# Patient Record
Sex: MALE | Race: WHITE | NOT HISPANIC OR LATINO
[De-identification: names, ages, dates, MRNs, and addresses within clinical notes are randomized per-mention and may not be internally consistent; named-entity substitution may affect disease eponyms.]

---

## 2022-01-10 ENCOUNTER — APPOINTMENT (OUTPATIENT)
Dept: PHYSICAL MEDICINE AND REHAB | Facility: CLINIC | Age: 57
End: 2022-01-10

## 2022-01-10 VITALS
HEART RATE: 80 BPM | HEIGHT: 72 IN | SYSTOLIC BLOOD PRESSURE: 130 MMHG | WEIGHT: 220 LBS | BODY MASS INDEX: 29.8 KG/M2 | OXYGEN SATURATION: 100 % | DIASTOLIC BLOOD PRESSURE: 80 MMHG | TEMPERATURE: 98.6 F

## 2022-01-10 DIAGNOSIS — Z86.69 PERSONAL HISTORY OF OTHER DISEASES OF THE NERVOUS SYSTEM AND SENSE ORGANS: ICD-10-CM

## 2022-01-10 DIAGNOSIS — Z80.8 FAMILY HISTORY OF MALIGNANT NEOPLASM OF OTHER ORGANS OR SYSTEMS: ICD-10-CM

## 2022-01-10 DIAGNOSIS — Z86.73 PERSONAL HISTORY OF TRANSIENT ISCHEMIC ATTACK (TIA), AND CEREBRAL INFARCTION W/OUT RESIDUAL DEFICITS: ICD-10-CM

## 2022-01-10 PROBLEM — Z00.00 ENCOUNTER FOR PREVENTIVE HEALTH EXAMINATION: Status: ACTIVE | Noted: 2022-01-10

## 2022-01-10 RX ORDER — HYDROCHLOROTHIAZIDE 12.5 MG/1
TABLET ORAL
Refills: 0 | Status: ACTIVE | COMMUNITY

## 2022-01-25 NOTE — END OF VISIT
[Time Spent: ___ minutes] : I have spent [unfilled] minutes of time on the encounter. Alert and oriented to person, place, time/situation. normal mood and affect. no apparent risk to self or others.

## 2022-02-14 ENCOUNTER — APPOINTMENT (OUTPATIENT)
Dept: PHYSICAL MEDICINE AND REHAB | Facility: CLINIC | Age: 57
End: 2022-02-14

## 2022-02-14 VITALS
HEART RATE: 78 BPM | OXYGEN SATURATION: 99 % | HEIGHT: 72 IN | BODY MASS INDEX: 29.8 KG/M2 | WEIGHT: 220 LBS | TEMPERATURE: 97.8 F

## 2022-02-14 NOTE — PHYSICAL EXAM
[FreeTextEntry1] : Examination of the Cervical Spine \par Flexion: 34 degrees\par Extension: 12 degrees\par Lateral Bend R 24 degrees    L 20 degrees\par Rotation R 42 degrees     L 46 degrees \par \par Palpation cervical spine: Tenderness and spasm bilateral trapezii and cervical paraspinal musculature. \par MMT U/E: left elbow flexion 5-./5 \par Reflexes: 2 throughout \par   [Normal] : The posterior cervical, anterior cervical, supraclavicular, axillary, femoral and inguinal nodes were non-tender and normal size [de-identified] : see exam  [de-identified] : see exam  [de-identified] : see exam  [de-identified] : see exam

## 2022-02-14 NOTE — ASSESSMENT
[FreeTextEntry1] : Acupuncture 10 sessions C/S  \par Awaiting Auth for PT 3xweek/Week's \par HEP \par Recheck 4 weeks

## 2022-02-14 NOTE — HISTORY OF PRESENT ILLNESS
[FreeTextEntry1] : Pt continues with c/o neck pain with intermittent radiation of pain and paresthesia involving his bilateral upper Extemities. 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 [No] : The patient is currently not working.

## 2022-02-17 ENCOUNTER — APPOINTMENT (OUTPATIENT)
Dept: PHYSICAL MEDICINE AND REHAB | Facility: CLINIC | Age: 57
End: 2022-02-17

## 2022-02-25 ENCOUNTER — APPOINTMENT (OUTPATIENT)
Dept: PHYSICAL MEDICINE AND REHAB | Facility: CLINIC | Age: 57
End: 2022-02-25

## 2022-03-01 ENCOUNTER — APPOINTMENT (OUTPATIENT)
Dept: PHYSICAL MEDICINE AND REHAB | Facility: CLINIC | Age: 57
End: 2022-03-01

## 2022-03-08 ENCOUNTER — APPOINTMENT (OUTPATIENT)
Dept: PHYSICAL MEDICINE AND REHAB | Facility: CLINIC | Age: 57
End: 2022-03-08

## 2022-03-15 ENCOUNTER — APPOINTMENT (OUTPATIENT)
Dept: PHYSICAL MEDICINE AND REHAB | Facility: CLINIC | Age: 57
End: 2022-03-15

## 2022-03-22 ENCOUNTER — APPOINTMENT (OUTPATIENT)
Dept: PHYSICAL MEDICINE AND REHAB | Facility: CLINIC | Age: 57
End: 2022-03-22

## 2022-03-22 NOTE — HISTORY OF PRESENT ILLNESS
[FreeTextEntry1] : Pt continues with c/o neck pain with intermittent radiation of pain and paresthesia involving his bilateral upper extremities. Pt reports acupuncture has been beneficial in increasing ROM, an improving level of function. 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 [No] : The patient is currently not working.

## 2022-03-22 NOTE — PHYSICAL EXAM
[FreeTextEntry1] : Examination of the Cervical Spine \par Flexion: 37 degrees\par Extension: 15 degrees\par Lateral Bend R 27 degrees    L 22 degrees\par Rotation R 48 degrees     L 50 degrees \par \par Palpation cervical spine: Tenderness and spasm bilateral trapezii and cervical paraspinal musculature. \par MMT U/E: left elbow flexion 5-./5 \par Reflexes: 2 throughout \par   [Normal] : The posterior cervical, anterior cervical, supraclavicular, axillary, femoral and inguinal nodes were non-tender and normal size [de-identified] : see exam  [de-identified] : see exam  [de-identified] : see exam  [de-identified] : see exam

## 2022-04-11 ENCOUNTER — APPOINTMENT (OUTPATIENT)
Dept: PHYSICAL MEDICINE AND REHAB | Facility: CLINIC | Age: 57
End: 2022-04-11

## 2022-04-11 NOTE — PHYSICAL EXAM
[FreeTextEntry1] : Examination of the Cervical Spine \par Flexion: 40 degrees\par Extension: 16  degrees\par Lateral Bend R 27 degrees    L 22 degrees\par Rotation R 48 degrees     L 50 degrees \par \par Palpation cervical spine: Tenderness and spasm bilateral trapezii and cervical paraspinal musculature. \par MMT U/E: left elbow flexion 5-./5 \par Reflexes: 2 throughout \par   [Normal] : The posterior cervical, anterior cervical, supraclavicular, axillary, femoral and inguinal nodes were non-tender and normal size [de-identified] : see exam  [de-identified] : see exam  [de-identified] : see exam  [de-identified] : see exam

## 2022-04-11 NOTE — ASSESSMENT
[FreeTextEntry1] : Acupuncture 10 sessions C/S  \par HEP \par Recheck 4 weeks [Indicate if, in your opinion, the incident that the patient described was the competent medical cause of this injury/illness.] : The incident that the patient described was the competent medical cause of this injury/illness: Yes [Indicate if the patient's complaints are consistent with his/her history of the injury/illness.] : Indicate if the patient's complaints are consistent with his/her history of the injury/illness: Yes [Yes] : Yes, it is consistent [FreeTextEntry5] : 100%

## 2022-04-12 ENCOUNTER — APPOINTMENT (OUTPATIENT)
Dept: PHYSICAL MEDICINE AND REHAB | Facility: CLINIC | Age: 57
End: 2022-04-12
Payer: OTHER MISCELLANEOUS

## 2022-04-12 PROCEDURE — 99072 ADDL SUPL MATRL&STAF TM PHE: CPT

## 2022-04-12 PROCEDURE — 97814 ACUP 1/> W/ESTIM EA ADDL 15: CPT

## 2022-04-12 PROCEDURE — 97813 ACUP 1/> W/ESTIM 1ST 15 MIN: CPT

## 2022-04-12 PROCEDURE — 97811 ACUP 1/> W/O ESTIM EA ADD 15: CPT | Mod: 59

## 2022-04-26 ENCOUNTER — APPOINTMENT (OUTPATIENT)
Dept: PHYSICAL MEDICINE AND REHAB | Facility: CLINIC | Age: 57
End: 2022-04-26
Payer: OTHER MISCELLANEOUS

## 2022-04-26 PROCEDURE — 97811 ACUP 1/> W/O ESTIM EA ADD 15: CPT | Mod: 59

## 2022-04-26 PROCEDURE — 99072 ADDL SUPL MATRL&STAF TM PHE: CPT

## 2022-04-26 PROCEDURE — 97813 ACUP 1/> W/ESTIM 1ST 15 MIN: CPT

## 2022-04-26 PROCEDURE — 97814 ACUP 1/> W/ESTIM EA ADDL 15: CPT

## 2022-05-03 ENCOUNTER — APPOINTMENT (OUTPATIENT)
Dept: PHYSICAL MEDICINE AND REHAB | Facility: CLINIC | Age: 57
End: 2022-05-03
Payer: OTHER MISCELLANEOUS

## 2022-05-03 PROCEDURE — 99072 ADDL SUPL MATRL&STAF TM PHE: CPT

## 2022-05-03 PROCEDURE — 97811 ACUP 1/> W/O ESTIM EA ADD 15: CPT | Mod: 59

## 2022-05-03 PROCEDURE — 97814 ACUP 1/> W/ESTIM EA ADDL 15: CPT

## 2022-05-03 PROCEDURE — 97813 ACUP 1/> W/ESTIM 1ST 15 MIN: CPT

## 2022-05-10 ENCOUNTER — APPOINTMENT (OUTPATIENT)
Dept: PHYSICAL MEDICINE AND REHAB | Facility: CLINIC | Age: 57
End: 2022-05-10
Payer: OTHER MISCELLANEOUS

## 2022-05-10 PROCEDURE — 99072 ADDL SUPL MATRL&STAF TM PHE: CPT

## 2022-05-10 PROCEDURE — 97814 ACUP 1/> W/ESTIM EA ADDL 15: CPT

## 2022-05-10 PROCEDURE — 97811 ACUP 1/> W/O ESTIM EA ADD 15: CPT | Mod: 59

## 2022-05-10 PROCEDURE — 97813 ACUP 1/> W/ESTIM 1ST 15 MIN: CPT

## 2022-05-17 ENCOUNTER — APPOINTMENT (OUTPATIENT)
Dept: PHYSICAL MEDICINE AND REHAB | Facility: CLINIC | Age: 57
End: 2022-05-17
Payer: OTHER MISCELLANEOUS

## 2022-05-17 PROCEDURE — 97814 ACUP 1/> W/ESTIM EA ADDL 15: CPT

## 2022-05-17 PROCEDURE — 97813 ACUP 1/> W/ESTIM 1ST 15 MIN: CPT

## 2022-05-17 PROCEDURE — 99072 ADDL SUPL MATRL&STAF TM PHE: CPT

## 2022-05-17 PROCEDURE — 97811 ACUP 1/> W/O ESTIM EA ADD 15: CPT | Mod: 59

## 2022-05-25 ENCOUNTER — APPOINTMENT (OUTPATIENT)
Dept: PHYSICAL MEDICINE AND REHAB | Facility: CLINIC | Age: 57
End: 2022-05-25
Payer: OTHER MISCELLANEOUS

## 2022-05-25 PROCEDURE — 99072 ADDL SUPL MATRL&STAF TM PHE: CPT

## 2022-05-25 PROCEDURE — 99213 OFFICE O/P EST LOW 20 MIN: CPT

## 2022-05-25 NOTE — HISTORY OF PRESENT ILLNESS
[FreeTextEntry1] : Pt continues with c/o neck pain with intermittent radiation of pain and paresthesia involving his bilateral upper extremities. Pt reports acupuncture has been beneficial in decreasing muscle spasm and improvement in ROM. 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 [No] : The patient is currently not working.

## 2022-05-25 NOTE — ASSESSMENT
[FreeTextEntry1] : Request acupuncture 10 sessions C/S \par HEP \par Recheck 4 weeks [Indicate if, in your opinion, the incident that the patient described was the competent medical cause of this injury/illness.] : The incident that the patient described was the competent medical cause of this injury/illness: Yes [Indicate if the patient's complaints are consistent with his/her history of the injury/illness.] : Indicate if the patient's complaints are consistent with his/her history of the injury/illness: Yes [Yes] : Yes, it is consistent [FreeTextEntry5] : 100%

## 2022-05-25 NOTE — PHYSICAL EXAM
[FreeTextEntry1] : Examination of the Cervical Spine \par Flexion: 40 degrees\par Extension: 15  degrees\par Lateral Bend R 30 degrees    L 25 degrees\par Rotation R 52 degrees     L 55 degrees \par \par Palpation cervical spine: Tenderness and spasm bilateral trapezii and cervical paraspinal musculature, however improvement noted. \par MMT U/E: left elbow flexion 5-./5 \par Reflexes: 2 throughout \par   [Normal] : The posterior cervical, anterior cervical, supraclavicular, axillary, femoral and inguinal nodes were non-tender and normal size [de-identified] : see exam  [de-identified] : see exam  [de-identified] : see exam  [de-identified] : see exam

## 2022-06-23 ENCOUNTER — APPOINTMENT (OUTPATIENT)
Dept: PHYSICAL MEDICINE AND REHAB | Facility: CLINIC | Age: 57
End: 2022-06-23
Payer: OTHER MISCELLANEOUS

## 2022-06-23 PROCEDURE — 99213 OFFICE O/P EST LOW 20 MIN: CPT

## 2022-06-23 PROCEDURE — 99072 ADDL SUPL MATRL&STAF TM PHE: CPT

## 2022-06-23 NOTE — PHYSICAL EXAM
[Normal] : The posterior cervical, anterior cervical, supraclavicular, axillary, femoral and inguinal nodes were non-tender and normal size [FreeTextEntry1] : Examination of the Cervical Spine \par Flexion: 40 degrees\par Extension: 12  degrees\par Lateral Bend R 28 degrees    L 23 degrees\par Rotation R 52 degrees     L 53 degrees \par \par Palpation cervical spine: Tenderness and spasm bilateral trapezii and cervical paraspinal musculature.  \par MMT U/E: left elbow flexion 5-./5 \par Reflexes: 2 throughout \par   [de-identified] : see exam  [de-identified] : see exam  [de-identified] : see exam  [de-identified] : see exam

## 2022-06-23 NOTE — HISTORY OF PRESENT ILLNESS
[Has the patient missed work because of the injury/illness?] : The patient has missed work because of the injury/illness. [No] : The patient is currently not working. [FreeTextEntry1] : Pt continues with c/o neck pain with intermittent radiation of pain and paresthesia involving his bilateral upper extremities. Pt reports some increase in neck pain without acup treatment. Recently received authorization and is anxious to resume.  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

## 2022-06-23 NOTE — ASSESSMENT
[Indicate if, in your opinion, the incident that the patient described was the competent medical cause of this injury/illness.] : The incident that the patient described was the competent medical cause of this injury/illness: Yes [Indicate if the patient's complaints are consistent with his/her history of the injury/illness.] : Indicate if the patient's complaints are consistent with his/her history of the injury/illness: Yes [Yes] : Yes, it is consistent [FreeTextEntry1] : Request acupuncture 10 sessions C/S \par HEP \par Recheck 4 weeks [FreeTextEntry5] : 100%

## 2022-07-07 ENCOUNTER — APPOINTMENT (OUTPATIENT)
Dept: PHYSICAL MEDICINE AND REHAB | Facility: CLINIC | Age: 57
End: 2022-07-07

## 2022-07-07 PROCEDURE — 97813 ACUP 1/> W/ESTIM 1ST 15 MIN: CPT

## 2022-07-07 PROCEDURE — 97814 ACUP 1/> W/ESTIM EA ADDL 15: CPT

## 2022-07-07 PROCEDURE — 99072 ADDL SUPL MATRL&STAF TM PHE: CPT

## 2022-07-07 PROCEDURE — 97811 ACUP 1/> W/O ESTIM EA ADD 15: CPT | Mod: 59

## 2022-07-15 ENCOUNTER — APPOINTMENT (OUTPATIENT)
Dept: PHYSICAL MEDICINE AND REHAB | Facility: CLINIC | Age: 57
End: 2022-07-15

## 2022-07-15 PROCEDURE — 97811 ACUP 1/> W/O ESTIM EA ADD 15: CPT | Mod: 59

## 2022-07-15 PROCEDURE — 97813 ACUP 1/> W/ESTIM 1ST 15 MIN: CPT

## 2022-07-15 PROCEDURE — 97814 ACUP 1/> W/ESTIM EA ADDL 15: CPT

## 2022-07-15 PROCEDURE — 99072 ADDL SUPL MATRL&STAF TM PHE: CPT

## 2022-07-22 ENCOUNTER — APPOINTMENT (OUTPATIENT)
Dept: PHYSICAL MEDICINE AND REHAB | Facility: CLINIC | Age: 57
End: 2022-07-22

## 2022-07-22 PROCEDURE — 99072 ADDL SUPL MATRL&STAF TM PHE: CPT

## 2022-07-22 PROCEDURE — 97813 ACUP 1/> W/ESTIM 1ST 15 MIN: CPT

## 2022-07-22 PROCEDURE — 97814 ACUP 1/> W/ESTIM EA ADDL 15: CPT

## 2022-07-22 PROCEDURE — 97811 ACUP 1/> W/O ESTIM EA ADD 15: CPT | Mod: 59

## 2022-07-29 ENCOUNTER — APPOINTMENT (OUTPATIENT)
Dept: PHYSICAL MEDICINE AND REHAB | Facility: CLINIC | Age: 57
End: 2022-07-29

## 2022-08-04 ENCOUNTER — APPOINTMENT (OUTPATIENT)
Dept: PHYSICAL MEDICINE AND REHAB | Facility: CLINIC | Age: 57
End: 2022-08-04

## 2022-08-04 PROCEDURE — 99213 OFFICE O/P EST LOW 20 MIN: CPT

## 2022-08-04 PROCEDURE — 99072 ADDL SUPL MATRL&STAF TM PHE: CPT

## 2022-08-04 NOTE — HISTORY OF PRESENT ILLNESS
[Has the patient missed work because of the injury/illness?] : The patient has missed work because of the injury/illness. [No] : The patient is currently not working. [FreeTextEntry1] : Pt continues with c/o neck pain with intermittent radiation of pain and paresthesia involving his bilateral upper extremities. Patient recently initiated acupuncture treatments and is reporting some improvement in terms of diminished pain involving his cervical spine.  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

## 2022-08-04 NOTE — PHYSICAL EXAM
[Normal] : The posterior cervical, anterior cervical, supraclavicular, axillary, femoral and inguinal nodes were non-tender and normal size [FreeTextEntry1] : Examination of the Cervical Spine \par Flexion: 40 degrees\par Extension: 10  degrees\par Lateral Bend R 30 degrees    L 25 degrees\par Rotation R 55 degrees     L 50 degrees \par \par Palpation cervical spine: Tenderness and spasm bilateral trapezii and cervical paraspinal musculature.  \par MMT U/E: left elbow flexion 5-./5 \par Reflexes: 2 throughout \par   [de-identified] : see exam  [de-identified] : see exam  [de-identified] : see exam  [de-identified] : see exam

## 2022-08-04 NOTE — ASSESSMENT
[Indicate if, in your opinion, the incident that the patient described was the competent medical cause of this injury/illness.] : The incident that the patient described was the competent medical cause of this injury/illness: Yes [Indicate if the patient's complaints are consistent with his/her history of the injury/illness.] : Indicate if the patient's complaints are consistent with his/her history of the injury/illness: Yes [Yes] : Yes, it is consistent [FreeTextEntry1] : Continue with acupuncture treatments\par HEP \par Recheck 4 weeks [FreeTextEntry5] : 100%

## 2022-08-05 ENCOUNTER — APPOINTMENT (OUTPATIENT)
Dept: PHYSICAL MEDICINE AND REHAB | Facility: CLINIC | Age: 57
End: 2022-08-05

## 2022-08-05 PROCEDURE — 97813 ACUP 1/> W/ESTIM 1ST 15 MIN: CPT

## 2022-08-05 PROCEDURE — 97814 ACUP 1/> W/ESTIM EA ADDL 15: CPT

## 2022-08-05 PROCEDURE — 99072 ADDL SUPL MATRL&STAF TM PHE: CPT

## 2022-08-05 PROCEDURE — 97811 ACUP 1/> W/O ESTIM EA ADD 15: CPT | Mod: 59

## 2022-08-16 ENCOUNTER — APPOINTMENT (OUTPATIENT)
Dept: PHYSICAL MEDICINE AND REHAB | Facility: CLINIC | Age: 57
End: 2022-08-16

## 2022-08-16 PROCEDURE — 97813 ACUP 1/> W/ESTIM 1ST 15 MIN: CPT

## 2022-08-16 PROCEDURE — 99072 ADDL SUPL MATRL&STAF TM PHE: CPT

## 2022-08-16 PROCEDURE — 97814 ACUP 1/> W/ESTIM EA ADDL 15: CPT

## 2022-08-16 PROCEDURE — 97811 ACUP 1/> W/O ESTIM EA ADD 15: CPT | Mod: 59

## 2022-08-16 NOTE — PROCEDURE
[de-identified] : Acupuncture treatment:\par Baldry technique with multiple needles to the cervical paraspinal and parascapular musculature with ultraviolet lamp x45 minutes\par Paracervical chain (bladder meridian) with ES x45 minutes.\par Pens treatment cervical spine with ES x45 minutes\par BL 10, SP 6, ST 36, LR 3, LI 4, BL 60,\par Patient tolerated procedure well

## 2022-08-23 ENCOUNTER — APPOINTMENT (OUTPATIENT)
Dept: PHYSICAL MEDICINE AND REHAB | Facility: CLINIC | Age: 57
End: 2022-08-23

## 2022-08-23 PROCEDURE — 97813 ACUP 1/> W/ESTIM 1ST 15 MIN: CPT

## 2022-08-23 PROCEDURE — 97814 ACUP 1/> W/ESTIM EA ADDL 15: CPT

## 2022-08-23 PROCEDURE — 97811 ACUP 1/> W/O ESTIM EA ADD 15: CPT

## 2022-08-23 PROCEDURE — 99072 ADDL SUPL MATRL&STAF TM PHE: CPT

## 2022-09-01 ENCOUNTER — APPOINTMENT (OUTPATIENT)
Dept: PHYSICAL MEDICINE AND REHAB | Facility: CLINIC | Age: 57
End: 2022-09-01

## 2022-09-08 ENCOUNTER — APPOINTMENT (OUTPATIENT)
Dept: PHYSICAL MEDICINE AND REHAB | Facility: CLINIC | Age: 57
End: 2022-09-08

## 2022-09-08 PROCEDURE — 99213 OFFICE O/P EST LOW 20 MIN: CPT

## 2022-09-08 PROCEDURE — 99072 ADDL SUPL MATRL&STAF TM PHE: CPT

## 2022-09-08 NOTE — HISTORY OF PRESENT ILLNESS
[FreeTextEntry1] : Pt continues with c/o neck pain with intermittent radiation of pain and paresthesia involving his bilateral upper extremities. Pt continues to find acupuncture treatments beneficial in terms of PM.  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 [No] : The patient is currently not working.

## 2022-09-08 NOTE — PHYSICAL EXAM
[FreeTextEntry1] : Examination of the Cervical Spine \par Flexion: 40 degrees\par Extension: 12  degrees\par Lateral Bend R 30 degrees    L 25 degrees\par Rotation R 58 degrees     L 55 degrees \par \par Palpation cervical spine: Tenderness and spasm bilateral trapezii and cervical paraspinal musculature.  \par MMT U/E: left elbow flexion 5-./5 \par Reflexes: 2 throughout \par   [Normal] : The posterior cervical, anterior cervical, supraclavicular, axillary, femoral and inguinal nodes were non-tender and normal size [de-identified] : see exam  [de-identified] : see exam  [de-identified] : see exam  [de-identified] : see exam

## 2022-09-08 NOTE — ASSESSMENT
[FreeTextEntry1] : Continue with acupuncture treatments\par HEP \par Recheck 4 weeks [Indicate if, in your opinion, the incident that the patient described was the competent medical cause of this injury/illness.] : The incident that the patient described was the competent medical cause of this injury/illness: Yes [Indicate if the patient's complaints are consistent with his/her history of the injury/illness.] : Indicate if the patient's complaints are consistent with his/her history of the injury/illness: Yes [Yes] : Yes, it is consistent [FreeTextEntry5] : 100%

## 2022-09-13 ENCOUNTER — APPOINTMENT (OUTPATIENT)
Dept: PHYSICAL MEDICINE AND REHAB | Facility: CLINIC | Age: 57
End: 2022-09-13

## 2022-09-13 PROCEDURE — 97813 ACUP 1/> W/ESTIM 1ST 15 MIN: CPT

## 2022-09-13 PROCEDURE — 97814 ACUP 1/> W/ESTIM EA ADDL 15: CPT

## 2022-09-13 PROCEDURE — 99072 ADDL SUPL MATRL&STAF TM PHE: CPT

## 2022-09-13 PROCEDURE — 97811 ACUP 1/> W/O ESTIM EA ADD 15: CPT | Mod: 59

## 2022-09-29 ENCOUNTER — APPOINTMENT (OUTPATIENT)
Dept: PHYSICAL MEDICINE AND REHAB | Facility: CLINIC | Age: 57
End: 2022-09-29

## 2022-09-29 PROCEDURE — 97813 ACUP 1/> W/ESTIM 1ST 15 MIN: CPT

## 2022-09-29 PROCEDURE — 97814 ACUP 1/> W/ESTIM EA ADDL 15: CPT

## 2022-09-29 PROCEDURE — 97811 ACUP 1/> W/O ESTIM EA ADD 15: CPT | Mod: 59

## 2022-09-29 PROCEDURE — 99072 ADDL SUPL MATRL&STAF TM PHE: CPT

## 2022-10-20 ENCOUNTER — APPOINTMENT (OUTPATIENT)
Dept: PHYSICAL MEDICINE AND REHAB | Facility: CLINIC | Age: 57
End: 2022-10-20

## 2022-10-20 PROCEDURE — 99072 ADDL SUPL MATRL&STAF TM PHE: CPT

## 2022-10-20 PROCEDURE — 97811 ACUP 1/> W/O ESTIM EA ADD 15: CPT | Mod: 59

## 2022-10-20 PROCEDURE — 97813 ACUP 1/> W/ESTIM 1ST 15 MIN: CPT

## 2022-10-20 PROCEDURE — 97814 ACUP 1/> W/ESTIM EA ADDL 15: CPT

## 2022-10-24 ENCOUNTER — APPOINTMENT (OUTPATIENT)
Dept: PHYSICAL MEDICINE AND REHAB | Facility: CLINIC | Age: 57
End: 2022-10-24

## 2022-10-24 PROCEDURE — 99213 OFFICE O/P EST LOW 20 MIN: CPT

## 2022-10-24 PROCEDURE — 99072 ADDL SUPL MATRL&STAF TM PHE: CPT

## 2022-10-24 NOTE — HISTORY OF PRESENT ILLNESS
[Has the patient missed work because of the injury/illness?] : The patient has missed work because of the injury/illness. [No] : The patient is currently not working. [FreeTextEntry1] : Pt continues with c/o neck pain with intermittent radiation of pain and paresthesia involving his bilateral upper extremities. Pt continues to find acupuncture treatments beneficial with less difficulty with ADL'S.  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

## 2022-10-24 NOTE — PHYSICAL EXAM
[Normal] : The posterior cervical, anterior cervical, supraclavicular, axillary, femoral and inguinal nodes were non-tender and normal size [FreeTextEntry1] : Examination of the Cervical Spine \par Flexion: 40 degrees\par Extension: 15 degrees\par Lateral Bend R 25 degrees    L 28 degrees\par Rotation R 55 degrees     L 55 degrees \par \par Palpation cervical spine: Tenderness and moderate  spasm bilateral trapezii and cervical paraspinal musculature.  \par MMT U/E: left elbow flexion 5-./5 \par Reflexes: 2 throughout \par   [de-identified] : see exam  [de-identified] : see exam  [de-identified] : see exam  [de-identified] : see exam

## 2022-10-27 ENCOUNTER — APPOINTMENT (OUTPATIENT)
Dept: PHYSICAL MEDICINE AND REHAB | Facility: CLINIC | Age: 57
End: 2022-10-27

## 2022-10-27 PROCEDURE — 97811 ACUP 1/> W/O ESTIM EA ADD 15: CPT | Mod: 59

## 2022-10-27 PROCEDURE — 97814 ACUP 1/> W/ESTIM EA ADDL 15: CPT

## 2022-10-27 PROCEDURE — 97813 ACUP 1/> W/ESTIM 1ST 15 MIN: CPT

## 2022-10-27 PROCEDURE — 99072 ADDL SUPL MATRL&STAF TM PHE: CPT

## 2022-11-21 ENCOUNTER — APPOINTMENT (OUTPATIENT)
Dept: PHYSICAL MEDICINE AND REHAB | Facility: CLINIC | Age: 57
End: 2022-11-21

## 2022-11-21 PROCEDURE — 99213 OFFICE O/P EST LOW 20 MIN: CPT

## 2022-11-21 PROCEDURE — 99072 ADDL SUPL MATRL&STAF TM PHE: CPT

## 2022-11-21 NOTE — PHYSICAL EXAM
[Normal] : The posterior cervical, anterior cervical, supraclavicular, axillary, femoral and inguinal nodes were non-tender and normal size [FreeTextEntry1] : Examination of the Cervical Spine \par Flexion: 40 degrees\par Extension: 12 degrees\par Lateral Bend R 20 degrees    L 22 degrees\par Rotation R 45 degrees     L 50  degrees \par \par Palpation cervical spine: increasing tenderness and spasm bilateral trapezii and cervical paraspinal musculature.  \par MMT U/E: left elbow flexion 5-./5 \par Reflexes: 2 throughout \par   [de-identified] : see exam  [de-identified] : see exam  [de-identified] : see exam  [de-identified] : see exam

## 2022-11-21 NOTE — HISTORY OF PRESENT ILLNESS
[Has the patient missed work because of the injury/illness?] : The patient has missed work because of the injury/illness. [No] : The patient is currently not working. [FreeTextEntry1] : Pt continues with c/o neck pain with intermittent radiation of pain and paresthesia involving his bilateral upper extremities. Pt is noting increasing spasm without acupuncture tx.  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

## 2022-11-21 NOTE — ASSESSMENT
[Indicate if, in your opinion, the incident that the patient described was the competent medical cause of this injury/illness.] : The incident that the patient described was the competent medical cause of this injury/illness: Yes [Indicate if the patient's complaints are consistent with his/her history of the injury/illness.] : Indicate if the patient's complaints are consistent with his/her history of the injury/illness: Yes [Yes] : Yes, it is consistent [FreeTextEntry1] : \par Trigger points 3 session to the cervical spine as per medical tx guidelines. \par HEP \par Recheck 4 weeks [FreeTextEntry5] : 100%

## 2022-11-29 ENCOUNTER — APPOINTMENT (OUTPATIENT)
Dept: PHYSICAL MEDICINE AND REHAB | Facility: CLINIC | Age: 57
End: 2022-11-29

## 2022-11-29 PROCEDURE — 99213 OFFICE O/P EST LOW 20 MIN: CPT | Mod: 25

## 2022-11-29 PROCEDURE — 99072 ADDL SUPL MATRL&STAF TM PHE: CPT

## 2022-11-29 PROCEDURE — 20553 NJX 1/MLT TRIGGER POINTS 3/>: CPT

## 2022-11-29 RX ADMIN — Medication 10 %: at 00:00

## 2022-11-29 NOTE — PROCEDURE
[de-identified] : Sterile Technique Injection \par 2 Syringes of 5 cc 1 % Lidocaine HCL \par \par Bilateral Trapezii \par Bilateral levator \par \par \par Ice injection site PRN \par Injection tolerated well\par \par \par EXAMINATION OF CERVICAL SPINE: \par \par Flexion:  40° \par Extension:  10°\par Lateral Bend: R: 25° \par                        L:  22°\par Rotation: R:   45°\par                L:   55°\par \par Palpation cervical spine: Increasing tenderness and spasm bilateral trapezii and cervical paraspinal musculature and levator scapulae \par MMT U/E: Left elbow extension 5 -/5\par Reflexes: 2 and symmetrical throughout\par \par \par

## 2022-12-07 ENCOUNTER — APPOINTMENT (OUTPATIENT)
Dept: PHYSICAL MEDICINE AND REHAB | Facility: CLINIC | Age: 57
End: 2022-12-07

## 2022-12-07 PROCEDURE — 99072 ADDL SUPL MATRL&STAF TM PHE: CPT

## 2022-12-07 PROCEDURE — 99213 OFFICE O/P EST LOW 20 MIN: CPT | Mod: 25

## 2022-12-07 PROCEDURE — 20553 NJX 1/MLT TRIGGER POINTS 3/>: CPT

## 2022-12-07 NOTE — PROCEDURE
[de-identified] : Sterile Technique Injection \par 2 Syringes of 5 cc 1 % Lidocaine HCL \par \par Bilateral Trapezii \par Bilateral levator \par \par \par Ice injection site PRN \par Injection tolerated well\par \par \par EXAMINATION OF CERVICAL SPINE: \par \par Flexion:  42° \par Extension:  10°\par Lateral Bend: R: 25° \par                        L:  22°\par Rotation: R:   45°\par                L:   55°\par \par Palpation cervical spine: Increasing tenderness and spasm bilateral trapezii and cervical paraspinal musculature and levator scapulae \par MMT U/E: Left elbow extension 5 -/5\par Reflexes: 2 and symmetrical throughout\par \par \par

## 2022-12-21 ENCOUNTER — APPOINTMENT (OUTPATIENT)
Dept: PHYSICAL MEDICINE AND REHAB | Facility: CLINIC | Age: 57
End: 2022-12-21

## 2022-12-21 PROCEDURE — 99213 OFFICE O/P EST LOW 20 MIN: CPT | Mod: 25

## 2022-12-21 PROCEDURE — 20553 NJX 1/MLT TRIGGER POINTS 3/>: CPT

## 2022-12-21 PROCEDURE — 99072 ADDL SUPL MATRL&STAF TM PHE: CPT

## 2022-12-21 NOTE — PROCEDURE
[de-identified] : Sterile Technique Injection \par 2 Syringes of 5 cc 1 % Lidocaine HCL \par \par Bilateral Trapezii \par Bilateral levator \par \par \par Ice injection site PRN \par Injection tolerated well\par \par \par EXAMINATION OF CERVICAL SPINE: \par \par Flexion:  40° \par Extension:  10°\par Lateral Bend: R: 25° \par                        L:  20°\par Rotation: R:   50°\par                L:   55°\par \par Palpation cervical spine:  tenderness and spasm bilateral trapezii and cervical paraspinal musculature and levator scapulae \par MMT U/E: Left elbow extension 5 -/5\par Reflexes: 2 and symmetrical throughout\par \par \par

## 2022-12-28 ENCOUNTER — APPOINTMENT (OUTPATIENT)
Dept: PHYSICAL MEDICINE AND REHAB | Facility: CLINIC | Age: 57
End: 2022-12-28

## 2022-12-28 PROCEDURE — 99072 ADDL SUPL MATRL&STAF TM PHE: CPT

## 2022-12-28 PROCEDURE — 99213 OFFICE O/P EST LOW 20 MIN: CPT

## 2022-12-28 NOTE — HISTORY OF PRESENT ILLNESS
[FreeTextEntry1] : Pt noticing some improvement with trigger point injections however he continues with c/o neck pain with intermittent radiation of pain and paresthesia involving his bilateral upper extremities.  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 [No] : The patient is currently not working.

## 2022-12-28 NOTE — ASSESSMENT
[FreeTextEntry1] : \par HEP reviewed with Pt. \par Recheck 4 weeks [Indicate if, in your opinion, the incident that the patient described was the competent medical cause of this injury/illness.] : The incident that the patient described was the competent medical cause of this injury/illness: Yes [Indicate if the patient's complaints are consistent with his/her history of the injury/illness.] : Indicate if the patient's complaints are consistent with his/her history of the injury/illness: Yes [Yes] : Yes, it is consistent [FreeTextEntry5] : 100%

## 2022-12-28 NOTE — PHYSICAL EXAM
[FreeTextEntry1] : Examination of the Cervical Spine \par Flexion: 40 degrees\par Extension: 15 degrees\par Lateral Bend R 22 degrees    L 25 degrees\par Rotation R 45 degrees     L 50  degrees \par \par Palpation cervical spine: tenderness and spasm bilateral trapezii and cervical paraspinal musculature.  \par MMT U/E: left elbow flexion 5-./5 \par Reflexes: 2 throughout \par   [Normal] : The posterior cervical, anterior cervical, supraclavicular, axillary, femoral and inguinal nodes were non-tender and normal size [de-identified] : see exam  [de-identified] : see exam  [de-identified] : see exam  [de-identified] : see exam

## 2023-01-30 ENCOUNTER — APPOINTMENT (OUTPATIENT)
Dept: PHYSICAL MEDICINE AND REHAB | Facility: CLINIC | Age: 58
End: 2023-01-30
Payer: OTHER MISCELLANEOUS

## 2023-01-30 PROCEDURE — 99072 ADDL SUPL MATRL&STAF TM PHE: CPT

## 2023-01-30 PROCEDURE — 99213 OFFICE O/P EST LOW 20 MIN: CPT

## 2023-01-30 NOTE — PHYSICAL EXAM
[Normal] : The posterior cervical, anterior cervical, supraclavicular, axillary, femoral and inguinal nodes were non-tender and normal size [FreeTextEntry1] : Examination of the Cervical Spine \par Flexion: 40 degrees\par Extension: 10 degrees\par Lateral Bend R 20 degrees    L 25 degrees\par Rotation R 45 degrees     L 50  degrees \par \par Palpation cervical spine: tenderness and spasm bilateral trapezii and cervical paraspinal musculature.  \par MMT U/E: left elbow flexion 5-./5 \par Reflexes: 2 throughout \par   [de-identified] : see exam  [de-identified] : see exam  [de-identified] : see exam  [de-identified] : see exam

## 2023-01-30 NOTE — ASSESSMENT
[Indicate if, in your opinion, the incident that the patient described was the competent medical cause of this injury/illness.] : The incident that the patient described was the competent medical cause of this injury/illness: Yes [Indicate if the patient's complaints are consistent with his/her history of the injury/illness.] : Indicate if the patient's complaints are consistent with his/her history of the injury/illness: Yes [Yes] : Yes, it is consistent [FreeTextEntry1] : HEP reviewed with Pt. \par Recheck 4 weeks [FreeTextEntry5] : 100%

## 2023-01-30 NOTE — HISTORY OF PRESENT ILLNESS
[Has the patient missed work because of the injury/illness?] : The patient has missed work because of the injury/illness. [No] : The patient is currently not working. [FreeTextEntry1] : Pt continues to complain of neck pain with intermittent radiation of pain and paresthesia involving his bilateral upper extremities.  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

## 2023-03-15 ENCOUNTER — APPOINTMENT (OUTPATIENT)
Dept: PHYSICAL MEDICINE AND REHAB | Facility: CLINIC | Age: 58
End: 2023-03-15
Payer: OTHER MISCELLANEOUS

## 2023-03-15 PROCEDURE — 99072 ADDL SUPL MATRL&STAF TM PHE: CPT

## 2023-03-15 PROCEDURE — 99213 OFFICE O/P EST LOW 20 MIN: CPT

## 2023-04-24 ENCOUNTER — APPOINTMENT (OUTPATIENT)
Dept: PHYSICAL MEDICINE AND REHAB | Facility: CLINIC | Age: 58
End: 2023-04-24
Payer: OTHER MISCELLANEOUS

## 2023-04-24 PROCEDURE — 99213 OFFICE O/P EST LOW 20 MIN: CPT

## 2023-04-24 NOTE — PHYSICAL EXAM
[Normal] : The posterior cervical, anterior cervical, supraclavicular, axillary, femoral and inguinal nodes were non-tender and normal size [FreeTextEntry1] : Examination of the Cervical Spine \par Flexion: 40 degrees\par Extension: 8 degrees\par Lateral Bend R 20 degrees    L 25 degrees\par Rotation R 45 degrees     L 50 degrees \par \par Palpation cervical spine: tenderness and spasm bilateral trapezii and cervical paraspinal musculature.  \par MMT U/E: left elbow flexion 5-./5 \par Reflexes: 2 throughout \par Sensation: decreased sensation left C6\par Spurling Compression: + [de-identified] : see exam  [de-identified] : see exam  [de-identified] : see exam  [de-identified] : see exam

## 2023-04-24 NOTE — ASSESSMENT
[FreeTextEntry1] : HEP reviewed with Pt. \par Recheck 4 weeks [Indicate if, in your opinion, the incident that the patient described was the competent medical cause of this injury/illness.] : The incident that the patient described was the competent medical cause of this injury/illness: Yes [Indicate if the patient's complaints are consistent with his/her history of the injury/illness.] : Indicate if the patient's complaints are consistent with his/her history of the injury/illness: Yes [Yes] : Yes, it is consistent [FreeTextEntry5] : 100%

## 2023-04-24 NOTE — PHYSICAL EXAM
[FreeTextEntry1] : Examination of the Cervical Spine \par Flexion: 40 degrees\par Extension: 10 degrees\par Lateral Bend R 18 degrees    L 25 degrees\par Rotation R 40 degrees     L 48  degrees \par \par Palpation cervical spine: tenderness and spasm bilateral trapezii and cervical paraspinal musculature.  \par MMT U/E: left elbow flexion 5-./5 \par Reflexes: 2 throughout \par Sensation: decreased sensation left C6\par Spurling Compression: + [Normal] : The posterior cervical, anterior cervical, supraclavicular, axillary, femoral and inguinal nodes were non-tender and normal size [de-identified] : see exam  [de-identified] : see exam  [de-identified] : see exam  [de-identified] : see exam

## 2023-04-24 NOTE — HISTORY OF PRESENT ILLNESS
[FreeTextEntry1] : Pt continues to complain of neck pain with intermittent radiation of pain and paresthesia involving his bilateral upper extremities. 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 [No] : The patient is currently not working.

## 2023-04-24 NOTE — PHYSICAL EXAM
[FreeTextEntry1] : Examination of the Cervical Spine \par Flexion: 40 degrees\par Extension: 10 degrees\par Lateral Bend R 18 degrees    L 25 degrees\par Rotation R 40 degrees     L 48  degrees \par \par Palpation cervical spine: tenderness and spasm bilateral trapezii and cervical paraspinal musculature.  \par MMT U/E: left elbow flexion 5-./5 \par Reflexes: 2 throughout \par Sensation: decreased sensation left C6\par Spurling Compression: + [Normal] : The posterior cervical, anterior cervical, supraclavicular, axillary, femoral and inguinal nodes were non-tender and normal size [de-identified] : see exam  [de-identified] : see exam  [de-identified] : see exam  [de-identified] : see exam

## 2023-04-24 NOTE — PHYSICAL EXAM
[FreeTextEntry1] : Examination of the Cervical Spine \par Flexion: 40 degrees\par Extension: 10 degrees\par Lateral Bend R 18 degrees    L 25 degrees\par Rotation R 40 degrees     L 48  degrees \par \par Palpation cervical spine: tenderness and spasm bilateral trapezii and cervical paraspinal musculature.  \par MMT U/E: left elbow flexion 5-./5 \par Reflexes: 2 throughout \par Sensation: decreased sensation left C6\par Spurling Compression: + [Normal] : The posterior cervical, anterior cervical, supraclavicular, axillary, femoral and inguinal nodes were non-tender and normal size [de-identified] : see exam  [de-identified] : see exam  [de-identified] : see exam  [de-identified] : see exam

## 2023-04-24 NOTE — HISTORY OF PRESENT ILLNESS
[Has the patient missed work because of the injury/illness?] : The patient has missed work because of the injury/illness. [No] : The patient is currently not working. [FreeTextEntry1] : Pt continues to complain of neck pain with intermittent radiation of pain and paresthesia involving his bilateral upper extremities.  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

## 2023-06-13 ENCOUNTER — APPOINTMENT (OUTPATIENT)
Dept: PHYSICAL MEDICINE AND REHAB | Facility: CLINIC | Age: 58
End: 2023-06-13
Payer: OTHER MISCELLANEOUS

## 2023-06-13 PROCEDURE — 99213 OFFICE O/P EST LOW 20 MIN: CPT

## 2023-06-13 NOTE — PHYSICAL EXAM
[FreeTextEntry1] : Examination of the Cervical Spine \par Flexion: 40 degrees\par Extension: 10 degrees\par Lateral Bend R 20 degrees    L 25 degrees\par Rotation R 45 degrees     L 50 degrees \par \par Palpation cervical spine: tenderness and spasm bilateral trapezii and cervical paraspinal musculature.  \par MMT U/E: left elbow flexion 5-./5 \par Reflexes: 2 throughout \par Sensation: decreased sensation left C6\par Spurling Compression: + [Normal] : Heart rate was normal and rhythm regular, normal S1 and S2, no gallops, no murmurs and no pericardial rub [de-identified] : see exam  [de-identified] : see exam  [de-identified] : see exam  [de-identified] : see exam

## 2023-06-22 ENCOUNTER — APPOINTMENT (OUTPATIENT)
Dept: PHYSICAL MEDICINE AND REHAB | Facility: CLINIC | Age: 58
End: 2023-06-22

## 2023-07-25 ENCOUNTER — APPOINTMENT (OUTPATIENT)
Dept: PHYSICAL MEDICINE AND REHAB | Facility: CLINIC | Age: 58
End: 2023-07-25

## 2023-07-25 DIAGNOSIS — I10 ESSENTIAL (PRIMARY) HYPERTENSION: ICD-10-CM

## 2023-08-21 PROBLEM — I10 HYPERTENSION: Status: ACTIVE | Noted: 2022-01-10

## 2023-08-21 NOTE — ASSESSMENT
[Indicate if, in your opinion, the incident that the patient described was the competent medical cause of this injury/illness.] : The incident that the patient described was the competent medical cause of this injury/illness: Yes [Indicate if the patient's complaints are consistent with his/her history of the injury/illness.] : Indicate if the patient's complaints are consistent with his/her history of the injury/illness: Yes [Yes] : Yes, it is consistent [FreeTextEntry1] : C4.3 Table 11.2, Class 4  Severity ranking E.   HEP reviewed with patient.   [FreeTextEntry5] : 100%

## 2023-08-21 NOTE — PHYSICAL EXAM
[Normal] : Heart rate was normal and rhythm regular, normal S1 and S2, no gallops, no murmurs and no pericardial rub [FreeTextEntry1] : EXAMINATION OF THE CERVICAL SPINE AND UPPER EXTREMITIES: Cranial nerve testing was intact.   Smell and taste were not tested.    Reflexes revealed diminished left bicep otherwise 2 throughout.  Upon inspection old anterior cervical surgical; site noted.  Manual muscle testing of the upper extremities revealed left elbow extension 5-/5, right elbow extension 5-/5. Sensory examination revealed decreased sensation left C6 dermatome distribution.  Vibratory and proprioceptive testing were intact.   Peripheral pulses were palpable bilaterally.   Diggs Test was negative.   Tinel Test was positive at the wrists bilaterally.   The Spurling Cervical Compression Test was positive .   The Adson's Maneuver was negative bilaterally.  No scapular winging was noted.   There was good scapular mobility.    Range of motion testing was performed with the use of a goniometer.   On range of motion testing,  flexion was to 40 degrees (normal - 45),  extension was to 12 degrees (normal - 55),  right rotation was to 45 degrees  (normal - 70),  left rotation was to 50  degrees (normal - 70),  right lateral bending was to 25 degrees  (normal - 40),  left lateral bending was to 25 degrees (normal - 40).  On palpation, of the cervical spine there was tenderness and spasm involving the bilateral trapezii and cervical paraspinal musculature. [de-identified] : see exam  [de-identified] : see exam  [de-identified] : see exam  [de-identified] : see exam

## 2023-08-21 NOTE — HISTORY OF PRESENT ILLNESS
[Has the patient missed work because of the injury/illness?] : The patient has missed work because of the injury/illness. [No] : The patient is currently not working. [FreeTextEntry1] : Pt continues to complain of neck pain with intermittent radiation of pain and paresthesia involving his bilateral upper extremities. Pt reports C/S pain is aggravated with extremes of ROM as well as lifting, pushing, pulling, and/or carrying activities. Presents today for eval of C4.3 classification.  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

## 2023-08-28 NOTE — HISTORY OF PRESENT ILLNESS
[FreeTextEntry1] : Pt has been diagnosed with repetitive stress injury involving his cervical spine with a date of incident 02/27/2020. Pt repots he suffered from a TIA on 12/01/2019. He denies any residuals from that event. Pt reports he had a trail of returning to work on 12/23/2019. He states he was unable to continue with employment of [employer]  due to severe neck pain with radicular symptoms involving his upper extremities. He came under the care of Neurosurgeon  and underwent cervical fusion surgery on 06/03/2020. He reports some improvement with his neck pain.  He continues with c/o neck pain with intermittent radiation of pain and paresthesia involving his bilateral upper extremities.

## 2023-08-28 NOTE — ASSESSMENT
[FreeTextEntry1] : Request Auth for PT [id number] as per [facility]. \par Request 10 sessions of Acupunction to the C/S. \par Recheck in 4 to 6 weeks

## 2023-08-28 NOTE — PHYSICAL EXAM
[Normal] : Normal skin color and pigmentation, normal turgor and no rash [FreeTextEntry1] : EXAMINATION OF THE CERVICAL SPINE AND UPPER EXTREMITIES:\par Cranial nerve testing was intact.  Smell and taste were not tested.  Visual fields were full.  There was no difficulty with finger to nose response.  Romberg testing was negative.  There was no dysmetria of the upper extremities. \par Reflexes revealed diminished left bicep otherwise 2 throughout. Upon inspection old anterior cervical surgical; site noted. Manual muscle testing of the upper extremities revealed left elbow extension 5-/5, bilateral thumb abduction 4+/5. \par \par Sensory examination revealed decreased sensation left C6 dermatome distribution. Vibratory and proprioceptive testing were intact.  Peripheral pulses were palpable bilaterally.  Diggs Test was negative.  Tinel Test was positive at the wrists bilaterally.  The Spurling Cervical Compression Test was positive .  The Adson?s Maneuver was negative bilaterally.  No scapular winging was noted.  There was good scapular mobility.  \par Range of motion testing was performed with the use of a goniometer.  On range of motion testing, flexion was to 35º (normal - 45º), extension was to 10º (normal - 55º), right rotation was to 45º (normal - 70º), left rotation was to 48º (normal - 70º), right lateral bending was to 24º (normal - 40º), and left lateral bending was to 20º (normal - 40º).\par \par On palpation, of the cervical spine there was tenderness and spasm involving the the bilateral trapezii and cervical paraspinal musculature. [de-identified] : see exam  [de-identified] : see exam  [de-identified] : see exam

## 2023-09-14 ENCOUNTER — APPOINTMENT (OUTPATIENT)
Dept: PHYSICAL MEDICINE AND REHAB | Facility: CLINIC | Age: 58
End: 2023-09-14
Payer: OTHER MISCELLANEOUS

## 2023-09-14 DIAGNOSIS — Z98.1 ARTHRODESIS STATUS: ICD-10-CM

## 2023-09-14 DIAGNOSIS — M54.12 RADICULOPATHY, CERVICAL REGION: ICD-10-CM

## 2023-09-14 DIAGNOSIS — M62.838 OTHER MUSCLE SPASM: ICD-10-CM

## 2023-09-14 DIAGNOSIS — M54.2 CERVICALGIA: ICD-10-CM

## 2023-09-14 DIAGNOSIS — M50.90 CERVICAL DISC DISORDER, UNSPECIFIED, UNSPECIFIED CERVICAL REGION: ICD-10-CM

## 2023-09-14 PROCEDURE — 99213 OFFICE O/P EST LOW 20 MIN: CPT

## 2023-10-04 ENCOUNTER — APPOINTMENT (OUTPATIENT)
Dept: PHYSICAL MEDICINE AND REHAB | Facility: CLINIC | Age: 58
End: 2023-10-04

## 2023-10-11 ENCOUNTER — APPOINTMENT (OUTPATIENT)
Dept: PHYSICAL MEDICINE AND REHAB | Facility: CLINIC | Age: 58
End: 2023-10-11
Payer: OTHER MISCELLANEOUS

## 2023-10-11 PROCEDURE — 99075 MEDICAL TESTIMONY: CPT

## 2024-11-25 ENCOUNTER — NON-APPOINTMENT (OUTPATIENT)
Age: 59
End: 2024-11-25

## 2024-11-25 DIAGNOSIS — Z78.9 OTHER SPECIFIED HEALTH STATUS: ICD-10-CM

## 2024-11-25 DIAGNOSIS — N18.2 CHRONIC KIDNEY DISEASE, STAGE 2 (MILD): ICD-10-CM

## 2024-11-25 DIAGNOSIS — R80.9 PROTEINURIA, UNSPECIFIED: ICD-10-CM

## 2024-11-25 RX ORDER — LOSARTAN POTASSIUM 25 MG/1
25 TABLET, FILM COATED ORAL DAILY
Refills: 0 | Status: ACTIVE | COMMUNITY